# Patient Record
Sex: FEMALE | Race: BLACK OR AFRICAN AMERICAN | URBAN - METROPOLITAN AREA
[De-identification: names, ages, dates, MRNs, and addresses within clinical notes are randomized per-mention and may not be internally consistent; named-entity substitution may affect disease eponyms.]

---

## 2022-02-18 ENCOUNTER — NURSE TRIAGE (OUTPATIENT)
Dept: NURSING | Facility: CLINIC | Age: 22
End: 2022-02-18
Payer: COMMERCIAL

## 2022-02-18 NOTE — TELEPHONE ENCOUNTER
"S-(situation): \"I was using the bathroom and my stool is super hard.\" Tried to poop for two days, tried a suppository and it didn't help.     B-(background): unable to poop for two days     A-(assessment): No fever, no vomiting, abdominal pain for 30 minutes prior to call    R-(recommendations): To be seen with in 24 hours. Home care advice reviewed. Pt states she did try a fleet enema but had no results,however, upon reviewing instructions, unclear if pt was using it correctly.     Reason for Disposition    Abdomen is more swollen than usual    Additional Information    Negative: [1] Abdomen pain is main symptom AND [2] male    Negative: [1] Abdomen pain is main symptom AND [2] adult female    Negative: Rectal bleeding or blood in stool is main symptom    Negative: Rectal pain or itching is main symptom    Negative: Constipation in a cancer patient who is currently (or recently) receiving chemotherapy or radiation therapy, or cancer patient who has metastatic or end-stage cancer and is receiving palliative care    Negative: Patient sounds very sick or weak to the triager    Negative: [1] Vomiting AND [2] contains bile (green color)    Negative: [1] Vomiting AND [2] abdomen looks much more swollen than usual    Negative: [1] Constant abdominal pain AND [2] present > 2 hours    Negative: [1] Rectal pain or fullness from fecal impaction (rectum full of stool) AND [2] NOT better after SITZ bath, suppository or enema    Negative: [1] Intermittent mild abdominal pain AND [2] fever    Protocols used: CONSTIPATION-A-    AMOS MATIAS RN on 2/18/2022 at 5:46 PM    "